# Patient Record
Sex: MALE | Race: ASIAN | URBAN - METROPOLITAN AREA
[De-identification: names, ages, dates, MRNs, and addresses within clinical notes are randomized per-mention and may not be internally consistent; named-entity substitution may affect disease eponyms.]

---

## 2021-03-29 ENCOUNTER — OFFICE VISIT (OUTPATIENT)
Dept: URBAN - METROPOLITAN AREA CLINIC 33 | Facility: CLINIC | Age: 61
End: 2021-03-29
Payer: COMMERCIAL

## 2021-03-29 DIAGNOSIS — H53.2 DIPLOPIA: Primary | ICD-10-CM

## 2021-03-29 PROCEDURE — 99203 OFFICE O/P NEW LOW 30 MIN: CPT | Performed by: OPTOMETRIST

## 2021-03-29 ASSESSMENT — INTRAOCULAR PRESSURE
OD: 18
OS: 17

## 2021-03-29 NOTE — IMPRESSION/PLAN
Impression: Diplopia: H53.2. Plan: Discussed condition with patient. Patient has acute onset vertical diplopia since 6 days. Patient reports vision has not improved during that time. Patient denies pain, headaches, decreased monocular vision, jaw claudication, numbness or paralysis to face or extremities. Patient has full range of motion of eyes, no pupil abnormality, and no signs of optic nerve disease. Discussed symptoms with patient and importance medical consultation and imaging of brain and orbits to rule out compressive lesions or vascular etiology.